# Patient Record
Sex: MALE | Race: WHITE | NOT HISPANIC OR LATINO | Employment: FULL TIME | URBAN - METROPOLITAN AREA
[De-identification: names, ages, dates, MRNs, and addresses within clinical notes are randomized per-mention and may not be internally consistent; named-entity substitution may affect disease eponyms.]

---

## 2020-07-06 ENCOUNTER — HOSPITAL ENCOUNTER (OUTPATIENT)
Facility: MEDICAL CENTER | Age: 25
End: 2020-07-06
Attending: PHYSICIAN ASSISTANT
Payer: COMMERCIAL

## 2020-07-06 ENCOUNTER — OFFICE VISIT (OUTPATIENT)
Dept: URGENT CARE | Facility: CLINIC | Age: 25
End: 2020-07-06
Payer: COMMERCIAL

## 2020-07-06 VITALS
DIASTOLIC BLOOD PRESSURE: 80 MMHG | OXYGEN SATURATION: 97 % | BODY MASS INDEX: 28.28 KG/M2 | SYSTOLIC BLOOD PRESSURE: 122 MMHG | WEIGHT: 202 LBS | HEIGHT: 71 IN | TEMPERATURE: 98.4 F | HEART RATE: 58 BPM | RESPIRATION RATE: 16 BRPM

## 2020-07-06 DIAGNOSIS — J02.9 PHARYNGITIS, UNSPECIFIED ETIOLOGY: ICD-10-CM

## 2020-07-06 LAB
INT CON NEG: NORMAL
INT CON POS: NORMAL
S PYO AG THROAT QL: NEGATIVE

## 2020-07-06 PROCEDURE — 99203 OFFICE O/P NEW LOW 30 MIN: CPT | Performed by: PHYSICIAN ASSISTANT

## 2020-07-06 PROCEDURE — 87880 STREP A ASSAY W/OPTIC: CPT | Performed by: PHYSICIAN ASSISTANT

## 2020-07-06 PROCEDURE — 87070 CULTURE OTHR SPECIMN AEROBIC: CPT

## 2020-07-06 SDOH — HEALTH STABILITY: MENTAL HEALTH: HOW OFTEN DO YOU HAVE A DRINK CONTAINING ALCOHOL?: MONTHLY OR LESS

## 2020-07-06 ASSESSMENT — ENCOUNTER SYMPTOMS
HOARSE VOICE: 0
SHORTNESS OF BREATH: 0
HEADACHES: 0
ABDOMINAL PAIN: 0
COUGH: 0
VOMITING: 0

## 2020-07-06 NOTE — PROGRESS NOTES
"Subjective:   Bharti Carroll is a 24 y.o. male who presents for Pharyngitis (swelling on the neck at the right side, hard to swallow started 6 days ago)      Pharyngitis    This is a new problem. Episode onset: 6 days. The problem has been waxing and waning. The pain is worse on the right side. There has been no fever. The pain is moderate. Pertinent negatives include no abdominal pain, congestion, coughing, ear pain, headaches, hoarse voice, shortness of breath or vomiting. Trouble swallowing: hurt to swallow. He has had no exposure to strep or mono. He has tried NSAIDs and acetaminophen for the symptoms. The treatment provided moderate relief.       Review of Systems   HENT: Negative for congestion, ear pain and hoarse voice. Trouble swallowing: hurt to swallow.    Respiratory: Negative for cough and shortness of breath.    Gastrointestinal: Negative for abdominal pain and vomiting.   Neurological: Negative for headaches.       Medications:    • This patient does not have an active medication from one of the medication groupers.    Allergies: Vicodin [hydrocodone-acetaminophen]    Problem List: Bharti Carroll does not have a problem list on file.    Surgical History:  No past surgical history on file.    Past Social Hx: Bharti Carroll  reports that he has never smoked. He has never used smokeless tobacco. He reports current alcohol use.     Past Family Hx:  Bharti Carroll family history is not on file.     Problem list, medications, and allergies reviewed by myself today in Epic.     Objective:     /80   Pulse (!) 58   Temp 36.9 °C (98.4 °F)   Resp 16   Ht 1.803 m (5' 11\")   Wt 91.6 kg (202 lb)   SpO2 97%   BMI 28.17 kg/m²     Physical Exam  Vitals signs reviewed.   Constitutional:       General: He is not in acute distress.     Appearance: Normal appearance. He is not toxic-appearing.   HENT:      Mouth/Throat:      Lips: Pink.      Mouth: Mucous membranes are moist. No oral lesions.      " Tongue: No lesions.      Pharynx: Oropharynx is clear. Uvula midline. Posterior oropharyngeal erythema present. No pharyngeal swelling, oropharyngeal exudate or uvula swelling.      Tonsils: No tonsillar exudate or tonsillar abscesses.   Eyes:      Conjunctiva/sclera: Conjunctivae normal.      Pupils: Pupils are equal, round, and reactive to light.   Neck:      Musculoskeletal: Neck supple.      Trachea: Trachea normal.   Cardiovascular:      Rate and Rhythm: Normal rate and regular rhythm.      Heart sounds: Normal heart sounds.   Pulmonary:      Breath sounds: Normal breath sounds.   Lymphadenopathy:      Cervical: Cervical adenopathy present.      Right cervical: Superficial cervical adenopathy present. No deep or posterior cervical adenopathy.     Left cervical: No superficial, deep or posterior cervical adenopathy.   Skin:     General: Skin is warm.   Neurological:      General: No focal deficit present.      Mental Status: He is alert and oriented to person, place, and time.   Psychiatric:         Mood and Affect: Mood normal.         Behavior: Behavior normal.         Assessment/Plan:     Diagnosis and associated orders:     1. Pharyngitis, unspecified etiology  POCT Rapid Strep A    CULTURE THROAT      Comments/MDM:     Discussed with patient signs and symptoms consistent with pharyngitis.   Strep A negative.   Culture throat and call back if positive and appropriate treatment.   Warm salt water gargles, cool liquids, soft foods.   Patient overall is very well-appearing, no hoarse voice, no drooling, suspicions for emergent pathology are low.         I confirmed the patient's mobile phone number, that their voicemail was set up, and received verbal consent to leave a voicemail if they do not answer the call.  The patient was amenable with this means of communication.      Red flags discussed and indications to immediately call 911 or present to the Emergency Department.   Supportive care, differential  diagnoses, and indications for immediate follow-up discussed with patient.    Pathogenesis of diagnosis discussed including typical length and natural progression. Patient expresses understanding and agrees to plan.    Advised the patient to follow-up with the primary care physician for recheck, reevaluation, and consideration of further management.    Please note that this dictation was created using voice recognition software. I have made a reasonable attempt to correct obvious errors, but I expect that there are errors of grammar and possibly content that I did not discover before finalizing the note.    This note was electronically signed by Leobardo Garner PA-C

## 2020-07-06 NOTE — PATIENT INSTRUCTIONS
Viral Pharyngitis  Viral pharyngitis is a viral infection that produces redness, pain, and swelling (inflammation) of the throat. It can spread from person to person (contagious).   CAUSES  Viral pharyngitis is caused by inhaling a large amount of certain germs called viruses. Many different viruses cause viral pharyngitis.  SYMPTOMS  Symptoms of viral pharyngitis include:  · Sore throat.  · Tiredness.  · Stuffy nose.  · Low-grade fever.  · Congestion.  · Cough.  TREATMENT  Treatment includes rest, drinking plenty of fluids, and the use of over-the-counter medication (approved by your caregiver).  HOME CARE INSTRUCTIONS   · Drink enough fluids to keep your urine clear or pale yellow.  · Eat soft, cold foods such as ice cream, frozen ice pops, or gelatin dessert.  · Gargle with warm salt water (1 tsp salt per 1 qt of water).  · If over age 7, throat lozenges may be used safely.  · Only take over-the-counter or prescription medicines for pain, discomfort, or fever as directed by your caregiver. Do not take aspirin.  To help prevent spreading viral pharyngitis to others, avoid:  · Mouth-to-mouth contact with others.  · Sharing utensils for eating and drinking.  · Coughing around others.  SEEK MEDICAL CARE IF:   · You are better in a few days, then become worse.  · You have a fever or pain not helped by pain medicines.  · There are any other changes that concern you.  Document Released: 09/27/2006 Document Revised: 03/11/2013 Document Reviewed: 02/23/2012  [employer]® Patient Information ©2014 [employer].

## 2020-07-07 ENCOUNTER — OFFICE VISIT (OUTPATIENT)
Dept: URGENT CARE | Facility: CLINIC | Age: 25
End: 2020-07-07
Payer: COMMERCIAL

## 2020-07-07 VITALS
SYSTOLIC BLOOD PRESSURE: 124 MMHG | WEIGHT: 202 LBS | HEART RATE: 65 BPM | DIASTOLIC BLOOD PRESSURE: 74 MMHG | TEMPERATURE: 98.1 F | OXYGEN SATURATION: 97 % | RESPIRATION RATE: 16 BRPM | HEIGHT: 71 IN | BODY MASS INDEX: 28.28 KG/M2

## 2020-07-07 DIAGNOSIS — R47.02 DYSPHASIA: ICD-10-CM

## 2020-07-07 DIAGNOSIS — J02.9 SORE THROAT: ICD-10-CM

## 2020-07-07 DIAGNOSIS — R59.0 CERVICAL ADENOPATHY: ICD-10-CM

## 2020-07-07 LAB
HETEROPH AB SER QL LA: NEGATIVE
INT CON NEG: NORMAL
INT CON POS: NORMAL

## 2020-07-07 PROCEDURE — 86308 HETEROPHILE ANTIBODY SCREEN: CPT | Performed by: FAMILY MEDICINE

## 2020-07-07 PROCEDURE — 99214 OFFICE O/P EST MOD 30 MIN: CPT | Performed by: FAMILY MEDICINE

## 2020-07-07 NOTE — PROGRESS NOTES
"Subjective:      Bharti Carroll is a 24 y.o. male who presents with Pharyngitis (was here yesterday for sore throat check but he said the swelling and the pain got worse.)            This is a new problem.  24-year-old presenting for repeat evaluation of sore throat and worsening neck swelling and also dysphagia for the past 2 days.  He was seen recently and was tested negative for strep.  He denies any exposure to mono.  Denies any abdominal pain, nausea or vomiting.  Review of systems otherwise negative      ROS       Objective:     /74   Pulse 65   Temp 36.7 °C (98.1 °F)   Resp 16   Ht 1.803 m (5' 11\")   Wt 91.6 kg (202 lb)   SpO2 97%   BMI 28.17 kg/m²      Physical Exam  Constitutional:       General: He is not in acute distress.     Appearance: He is not ill-appearing, toxic-appearing or diaphoretic.   HENT:      Head: Normocephalic and atraumatic.        Comments: Large right cervical adenopathy which extends inferiorly     Right Ear: External ear normal.      Left Ear: External ear normal.      Mouth/Throat:      Mouth: Mucous membranes are moist.      Pharynx: Uvula midline. No pharyngeal swelling, oropharyngeal exudate, posterior oropharyngeal erythema or uvula swelling.      Tonsils: No tonsillar exudate or tonsillar abscesses.   Eyes:      General: No scleral icterus.     Conjunctiva/sclera: Conjunctivae normal.   Cardiovascular:      Rate and Rhythm: Normal rate.   Pulmonary:      Effort: Pulmonary effort is normal. No respiratory distress.      Breath sounds: No stridor.   Lymphadenopathy:      Cervical: Cervical adenopathy present.   Skin:     General: Skin is warm.      Coloration: Skin is not jaundiced or pale.   Neurological:      Mental Status: He is alert and oriented to person, place, and time.   Psychiatric:         Mood and Affect: Mood normal.       Mono Is negative       Assessment/Plan:   ASSESSMENT:PLAN:  1. Cervical adenopathy    2. Dysphasia    3. Sore throat  - POCT " Mononucleosis (mono)    Negative test for mono  Strep was also negative.  Concern for abscess based on above symptoms  Advised ED evaluation.

## 2020-07-09 LAB
BACTERIA SPEC RESP CULT: NORMAL
SIGNIFICANT IND 70042: NORMAL
SITE SITE: NORMAL
SOURCE SOURCE: NORMAL

## 2023-03-01 ENCOUNTER — OFFICE VISIT (OUTPATIENT)
Dept: URGENT CARE | Facility: CLINIC | Age: 28
End: 2023-03-01
Payer: COMMERCIAL

## 2023-03-01 VITALS
BODY MASS INDEX: 29.66 KG/M2 | HEART RATE: 73 BPM | RESPIRATION RATE: 16 BRPM | DIASTOLIC BLOOD PRESSURE: 80 MMHG | OXYGEN SATURATION: 94 % | SYSTOLIC BLOOD PRESSURE: 114 MMHG | HEIGHT: 72 IN | TEMPERATURE: 97.5 F | WEIGHT: 219 LBS

## 2023-03-01 DIAGNOSIS — L03.119 CELLULITIS OF HAND: ICD-10-CM

## 2023-03-01 PROCEDURE — 99213 OFFICE O/P EST LOW 20 MIN: CPT | Performed by: FAMILY MEDICINE

## 2023-03-01 RX ORDER — DOXYCYCLINE HYCLATE 100 MG
100 TABLET ORAL 2 TIMES DAILY
Qty: 20 TABLET | Refills: 0 | Status: SHIPPED | OUTPATIENT
Start: 2023-03-01 | End: 2023-03-11

## 2023-03-01 NOTE — PROGRESS NOTES
Subjective:      27 y.o. male presents to urgent care for concerns of a bump to his right hand.  On Friday he noticed the side of his right hand had a bump to it, he does not remember hitting his hand or seeing a bug.  Over the weekend he accidentally bumped his hand and ripped the scab off that same area, he notes white stuff came out.  Gradually his hand has been becoming more edematous and erythematous.  He has not yet tried anything for this.  He remains afebrile.  He is right-hand dominant.    He denies any other questions or concerns at this time.    Current problem list, medication, and past medical/surgical history were reviewed in Epic.    ROS  See HPI     Objective:      /80   Pulse 73   Temp 36.4 °C (97.5 °F) (Temporal)   Resp 16   Ht 1.829 m (6')   Wt 99.3 kg (219 lb)   SpO2 94%   BMI 29.70 kg/m²     Physical Exam  Constitutional:       General: He is not in acute distress.     Appearance: He is not diaphoretic.   Cardiovascular:      Rate and Rhythm: Normal rate and regular rhythm.      Heart sounds: Normal heart sounds.   Pulmonary:      Effort: Pulmonary effort is normal. No respiratory distress.      Breath sounds: Normal breath sounds.   Musculoskeletal:      Comments: Dorsal aspect of right hand is edematous and edematous.  There is a scab to the lateral aspect of his right hand, no underlying collection of fluid or abscess.   Neurological:      Mental Status: He is alert.      Comments: Equal strength and sensation to hands bilaterally.   Psychiatric:         Mood and Affect: Affect normal.         Judgment: Judgment normal.     Assessment/Plan:     1. Cellulitis of hand  Prescription for doxycycline has been sent.  Tylenol ibuprofen as needed.  He was given strict ED precautions given the ability of hand infections to spread quickly.  - doxycycline (VIBRAMYCIN) 100 MG Tab; Take 1 Tablet by mouth 2 times a day for 10 days.  Dispense: 20 Tablet; Refill: 0      Instructed to return to  Urgent Care or nearest Emergency Department if symptoms fail to improve, for any change in condition, further concerns, or new concerning symptoms. Patient states understanding of the plan of care and discharge instructions.    Gela George M.D.